# Patient Record
Sex: MALE | Race: WHITE | NOT HISPANIC OR LATINO | Employment: FULL TIME | RURAL
[De-identification: names, ages, dates, MRNs, and addresses within clinical notes are randomized per-mention and may not be internally consistent; named-entity substitution may affect disease eponyms.]

---

## 2022-11-18 ENCOUNTER — OFFICE VISIT (OUTPATIENT)
Dept: OTOLARYNGOLOGY | Facility: CLINIC | Age: 47
End: 2022-11-18

## 2022-11-18 VITALS — WEIGHT: 203.2 LBS | OXYGEN SATURATION: 99 % | HEIGHT: 73 IN | BODY MASS INDEX: 26.93 KG/M2

## 2022-11-18 DIAGNOSIS — R43.8 HYPOSMIA: Primary | ICD-10-CM

## 2022-11-18 DIAGNOSIS — J31.0 CHRONIC RHINITIS: ICD-10-CM

## 2022-11-18 PROCEDURE — 99204 OFFICE O/P NEW MOD 45 MIN: CPT | Performed by: OTOLARYNGOLOGY

## 2022-11-18 PROCEDURE — 31231 NASAL ENDOSCOPY DX: CPT | Performed by: OTOLARYNGOLOGY

## 2022-11-18 RX ORDER — FLUTICASONE PROPIONATE 50 MCG
SPRAY, SUSPENSION (ML) NASAL
COMMUNITY
Start: 2022-10-13

## 2022-11-18 RX ORDER — AZELASTINE 1 MG/ML
2 SPRAY, METERED NASAL 2 TIMES DAILY
Qty: 30 ML | Refills: 11 | Status: SHIPPED | OUTPATIENT
Start: 2022-11-18

## 2022-11-22 NOTE — PROGRESS NOTES
Maria Alejandra Hassan is a 47 y.o. male.       History of Present Illness   Patient reports subjective decrease in sense of smell for years.  Symptoms are present on a daily basis.  Taste seems reasonably well-preserved.  Does have some allergy symptoms including congestion and postnasal drainage.  Used Flonase with no change in symptoms.  No previous nasal surgery or injury.  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        Patient is not a tobacco user and has not been counseled for use of tobacco products      Review of Systems        Objective   Physical Exam  Ears: External ears no deformity, canals no discharge, tympanic membranes intact clear and mobile bilaterally.  Nares: Angulated septal deformity to the left but no mass polyp or purulence boggy mucosa is present  Oral cavity: Lips and gums without lesions.  Tongue and floor of mouth without lesions.  Parotid and submandibular ducts unobstructed.  No mucosal lesions on the buccal mucosa or vestibule of the mouth.  Pharynx: No erythema exudate or mass  Neck: No lymphadenopathy.  No thyromegaly.  Trachea and larynx midline.  No masses in the parotid or submandibular glands.  Nasal endoscopy is performed: Anand-Synephrine and Xylocaine are instilled the nares bilaterally.  0° scope is passed into each nostril.  The inferior, middle, and superior turbinates as well as nasal septum and nasopharynx are examined.  Pertinent findings include: Septal deformity to the left impacting on the inferior turbinate.  No mass polyp or purulence visible in either middle meatal cleft or superior meatal cleft.  No other anatomic abnormality of the olfactory mucosa is noted.  No mass in the nasopharynx     Assessment and Plan    Diagnoses and all orders for this visit:    1. Hyposmia (Primary)    2. Chronic rhinitis    Other orders  -     azelastine (ASTELIN) 0.1 % nasal spray; 2 sprays into the nostril(s) as directed by provider 2 (Two) Times a Day. Use in each nostril as directed  Dispense: 30 mL; Refill: 11               Plan: Explained to the patient that I did not see any anatomic explanation for his symptoms.  I believe to a reasonable level of medical certainty that exposure to burn pits while in service may have contributed to his symptoms.  I have prescribed Astelin 2 sprays each nostril twice a day to use for his rhinitis symptoms and I will see him back in 6 months, sooner for problems.